# Patient Record
Sex: FEMALE | Race: WHITE | NOT HISPANIC OR LATINO | Employment: UNEMPLOYED | URBAN - METROPOLITAN AREA
[De-identification: names, ages, dates, MRNs, and addresses within clinical notes are randomized per-mention and may not be internally consistent; named-entity substitution may affect disease eponyms.]

---

## 2022-10-14 ENCOUNTER — OFFICE VISIT (OUTPATIENT)
Dept: BARIATRICS | Facility: CLINIC | Age: 27
End: 2022-10-14
Payer: COMMERCIAL

## 2022-10-14 VITALS
HEART RATE: 88 BPM | SYSTOLIC BLOOD PRESSURE: 124 MMHG | HEIGHT: 63 IN | DIASTOLIC BLOOD PRESSURE: 84 MMHG | WEIGHT: 187.8 LBS | BODY MASS INDEX: 33.27 KG/M2 | RESPIRATION RATE: 18 BRPM

## 2022-10-14 DIAGNOSIS — E66.9 OBESITY, CLASS I, BMI 30-34.9: ICD-10-CM

## 2022-10-14 DIAGNOSIS — E66.01 OBESITY, CLASS III, BMI 40-49.9 (MORBID OBESITY) (HCC): Primary | ICD-10-CM

## 2022-10-14 DIAGNOSIS — E66.9 OBESITY, CLASS II, BMI 35-39.9: ICD-10-CM

## 2022-10-14 PROBLEM — Q51.5: Status: ACTIVE | Noted: 2019-03-28

## 2022-10-14 PROBLEM — Q52.8 MAYER-ROKITANSKY-KUSTER-HAUSER SYNDROME: Status: ACTIVE | Noted: 2021-08-20

## 2022-10-14 PROBLEM — Q87.89 MAYER-ROKITANSKY-KUSTER-HAUSER SYNDROME: Status: ACTIVE | Noted: 2021-08-20

## 2022-10-14 PROBLEM — J45.30 MILD PERSISTENT ASTHMA WITHOUT COMPLICATION: Status: ACTIVE | Noted: 2020-09-30

## 2022-10-14 PROBLEM — J30.2 SEASONAL ALLERGIC RHINITIS: Status: ACTIVE | Noted: 2020-09-30

## 2022-10-14 PROBLEM — I47.9 PAROXYSMAL TACHYCARDIA (HCC): Status: ACTIVE | Noted: 2021-08-20

## 2022-10-14 PROBLEM — F90.2 ADHD (ATTENTION DEFICIT HYPERACTIVITY DISORDER), COMBINED TYPE: Status: ACTIVE | Noted: 2019-03-22

## 2022-10-14 PROCEDURE — 99203 OFFICE O/P NEW LOW 30 MIN: CPT | Performed by: FAMILY MEDICINE

## 2022-10-14 RX ORDER — DEXTROAMPHETAMINE SACCHARATE, AMPHETAMINE ASPARTATE MONOHYDRATE, DEXTROAMPHETAMINE SULFATE AND AMPHETAMINE SULFATE 5; 5; 5; 5 MG/1; MG/1; MG/1; MG/1
20 CAPSULE, EXTENDED RELEASE ORAL EVERY MORNING
COMMUNITY

## 2022-10-14 RX ORDER — ALBUTEROL SULFATE 90 UG/1
2 AEROSOL, METERED RESPIRATORY (INHALATION) EVERY 6 HOURS PRN
COMMUNITY

## 2022-10-14 NOTE — PROGRESS NOTES
Assessment/Plan:  Emerita was seen today for consult  Diagnoses and all orders for this visit:    Obesity, Class III, BMI 40-49 9 (morbid obesity) (HCC)    Obesity, Class II, BMI 35-39 9    Obesity, Class I, BMI 30-34 9    Obesity likely secondary to excess caloric intake  Based on HPI and incoming records patient did have obesity class three previously but was able to lose weight and is now in obesity class one  New patient packet discussed with patient  I have advised meeting with dietitian as well as meeting with  to help not only with meal planning but with emotional barriers to weight loss  Patient was in agreement  She would be interested in other protein shakes as well therefore I gave her some samples of the new direction product to try which can be incorporated into her menu planning with the dietitian  Other recommendations as below  - Discussed options of HealthyCORE-Intensive Lifestyle Intervention Program, Very Low Calorie Diet-VLCD and Conservative Program and the role of weight loss medications  - Explained the importance of making lifestyle changes first before starting any anti-obesity medications  Patient should demonstrate lifestyle changes first before anti-obesity medication can be initiated  - Patient is interested in pursuing Conservative Program  - Initial weight loss goal of 5-10% weight loss for improved health  - Weight loss can improve patient's co-morbid conditions and/or prevent weight-related complications  Goals:  Do not skip any meals! Food log (ie ) www myfitnesspal com,sparkpeople  com,loseit com,calorieking  com,etc  baritastic (use skinnytaste  com, dietdoctor  com or smartphone sanaz [employer] for recipes)  No sugary beverages  At least 64oz of water daily  Increase physical activity by 10 minutes daily   Gradually increase physical activity to a goal of 5 days per week for 30 minutes of MODERATE intensity PLUS 2 days per week of FULL BODY resistance training (use smartphone apps [employer], etc )   Meet with dietician  Meet with     Total time spent: 30 min, with >50% face-to-face time spent counseling patient on nonsurgical interventions for the treatment of excess weight  Discussed in detail nonsurgical options including intensive lifestyle intervention program, very low-calorie diet program and conservative program   Discussed the role of weight loss medications  Counseled patient on diet behavior and exercise modification for weight loss  Follow up in approximately 3 months with Non-Surgical Physician/Advanced Practitioner  Subjective:   Chief Complaint   Patient presents with   • Consult     Initial visit with Nacho Reilly, pt would like to get her BMI to a healthy number, SB 0/8       Patient ID: Mariana Pena  is a 33 y o  female with excess weight/obesity here to pursue weight management  Previous notes and records have been reviewed  Past Medical History:   Diagnosis Date   • Asthma    • Heart murmur    • Omyxf-Gvbjsqlzen-Ftyfao-Tajique syndrome      Past Surgical History:   Procedure Laterality Date   • ANUS SURGERY     • REVISION OF SCAR     • VAGINA SURGERY     • WISDOM TOOTH EXTRACTION         HPI:  Patient presents for medical weight management consultation  She tells me she was participating in the [address] [facility] weight loss program since the age of 17  Was seeing a dietitian as well as a provider  She was unhappy with the provider so she left to seek weight management elsewhere  At the start of the program with [address] [facility] patient believes she was 212 lb  By the summer of 2021 she was down to 168 lb  Was placed on metformin while she was there but did not tolerate the medication due to GI side effects  Was on Wellbutrin and naltrexone as well but patient tells me that combination did not work  She was on Victoza for about 10 months and lost 7 lb    That medication was discontinued in favor of Ozempic which patient was taking from May 2022 until 2022, lost 9 lb but had constipation on the medication  Not currently on any medication to help with weight loss  Admits that she eats out a lot and finds that her caloric intake may exceed 500 calories per meal   Admits to boredom eating as well  Does utilize over-the-counter protein shakes and patient would be open to using other protein shakes if available  Wt Readings from Last 20 Encounters:   10/14/22 85 2 kg (187 lb 12 8 oz)     Obesity/Excess Weight:  Severity: Mild  Onset:  Over the years    Modifiers: Diet and Exercise and Prescription Weight Loss Medications  Contributing factors: Poor Food Choices and Insufficient Physical Activity  Associated symptoms: fatigue    B- skips or eggs or protein shake  S- no  L- ramen noodles or breaded chicken or eats out often  S- no  D- mac'n'cheese or pizza or eats out often  S- sometimes pocorn    Hydration: 32oz water daily, 8oz coffee 2 TBPS creamer, (1 [employer] drink daily or 1 can [employer])   Alcohol: no  Smoking: no  Exercise: walks dog multiple times daily  Occupation: Travel Nurse  Sleep: 8-10hrs  STOP ban/8    The following portions of the patient's history were reviewed and updated as appropriate: allergies, current medications, past family history, past medical history, past social history, past surgical history, and problem list     Review of Systems   Constitutional: Negative for fever  Respiratory: Negative for shortness of breath  Cardiovascular: Negative for chest pain  Gastrointestinal: Negative for abdominal pain and blood in stool  Genitourinary: Negative for dysuria and hematuria  Musculoskeletal: Negative for arthralgias and myalgias  Skin: Negative for rash  Neurological: Negative for headaches  Psychiatric/Behavioral: Negative for dysphoric mood and suicidal ideas  The patient is not nervous/anxious          Objective:  /84 (BP Location: Left arm, Patient Position: Sitting, Cuff Size: Large)   Pulse 88   Resp 18   Ht 5' 3 31" (1 608 m)   Wt 85 2 kg (187 lb 12 8 oz)   BMI 32 95 kg/m²   Constitutional: Well-developed, well-nourished and Obese Body mass index is 32 95 kg/m²  Ochiltree Choi HEENT: No conjunctival injection  Pulmonary: No increased work of breathing or signs of respiratory distress  CV: Well-perfused  GI: Obese  Non-distended  MSK: No edema   Neuro: Oriented to person, place and time  Normal Speech  Normal gait  Psych: Normal affect and mood  Labs and Imaging  Recent labs and imaging have been personally reviewed    No results found for: WBC, HGB, HCT, MCV, PLT  No results found for: NA, SODIUM, K, CL, CO2, ANIONGAP, AGAP, BUN, CREATININE, GLUC, GLUF, CALCIUM, AST, ALT, ALKPHOS, PROT, TP, BILITOT, TBILI, EGFR  No results found for: HGBA1C  No results found for: [id number], TSH  No results found for: CHOLESTEROL  No results found for: HDL  No results found for: TRIG  No results found for: [address]

## 2022-10-14 NOTE — PATIENT INSTRUCTIONS
Goals:  Do not skip any meals! Food log (ie ) www myfitnesspal com,sparkpeople  com,loseit com,calorieking  com,etc  baritastic (use skinnytaste  com, dietdoctor  com or smartphone sanaz [employer] for recipes)  No sugary beverages  At least 64oz of water daily  Increase physical activity by 10 minutes daily   Gradually increase physical activity to a goal of 5 days per week for 30 minutes of MODERATE intensity PLUS 2 days per week of FULL BODY resistance training (use smartphone apps [employer], etc )   Meet with dietician  Meet with